# Patient Record
Sex: MALE | Race: WHITE | NOT HISPANIC OR LATINO | Employment: FULL TIME | URBAN - METROPOLITAN AREA
[De-identification: names, ages, dates, MRNs, and addresses within clinical notes are randomized per-mention and may not be internally consistent; named-entity substitution may affect disease eponyms.]

---

## 2023-12-08 ENCOUNTER — OFFICE VISIT (OUTPATIENT)
Dept: INTERNAL MEDICINE | Facility: CLINIC | Age: 33
End: 2023-12-08
Payer: COMMERCIAL

## 2023-12-08 VITALS
BODY MASS INDEX: 32.99 KG/M2 | WEIGHT: 230.4 LBS | HEART RATE: 76 BPM | DIASTOLIC BLOOD PRESSURE: 70 MMHG | HEIGHT: 70 IN | OXYGEN SATURATION: 98 % | TEMPERATURE: 96.9 F | SYSTOLIC BLOOD PRESSURE: 118 MMHG

## 2023-12-08 DIAGNOSIS — H93.13 BILATERAL TINNITUS: ICD-10-CM

## 2023-12-08 DIAGNOSIS — Z72.0 TOBACCO DIPPER: ICD-10-CM

## 2023-12-08 DIAGNOSIS — Z13.9 SCREENING FOR UNSPECIFIED CONDITION: ICD-10-CM

## 2023-12-08 DIAGNOSIS — Z87.828 HISTORY OF DISLOCATION OF FINGER: ICD-10-CM

## 2023-12-08 DIAGNOSIS — Z98.890 HISTORY OF TYMPANOPLASTY: ICD-10-CM

## 2023-12-08 DIAGNOSIS — E66.9 OBESITY, CLASS I, BMI 30-34.9: ICD-10-CM

## 2023-12-08 DIAGNOSIS — Z00.00 ANNUAL PHYSICAL EXAM: Primary | ICD-10-CM

## 2023-12-08 PROCEDURE — 99385 PREV VISIT NEW AGE 18-39: CPT | Performed by: FAMILY MEDICINE

## 2023-12-08 NOTE — PROGRESS NOTES
Date of Encounter: 2023  Patient: Joseph Rooney,  1990    Subjective   History of Presenting Illness  Chief complaint: Annual physical    No acute concerns.    History of tympanostomy tubes with tympanoplasty after membrane did not heal after removal.  He has bilateral tinnitus, mild, possibly with some hearing loss.    History of right fifth digit injury when playing baseball, did not get an x-ray, reduced range of motion and some deviation but does not cause any functional limitations day-to-day.     since a young age, gets oral cancer screenings through dentist, thinking about quitting sometime over the next few years.    Lives with wife Ester who is a trauma surgeon.  Recently moved from [place].  No children.  Has never smoked tobacco, dips about 1 can per week since early adulthood.  4 alcoholic beverages per week.  Exercises vigorously almost daily doing a combination of resistance training and cardiovascular activity, can get heart rate up to 190s.  No recent musculoskeletal injuries.  No family history of colon or prostate cancer.  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  Does not have a living will.  Reportedly up-to-date on influenza, COVID-19, and Td vaccinations, he can get me records if requested.    Review of Systems:  Negative for fever, congestion, chest pain upon exertion, shortness of breath, vomiting, dysuria, lymphadenopathy, muscle weakness, rashes.    The following portions of the patient's history were reviewed and updated as appropriate: allergies, current medications, past family history, past medical history, past social history, past surgical history and problem list.    Patient Active Problem List   Diagnosis    History of tympanoplasty    Bilateral tinnitus    Obesity, Class I, BMI 30-34.9        History of dislocation of finger     History reviewed. No pertinent past medical history.  Past Surgical History:   Procedure Laterality Date     "ADENOIDECTOMY      REPAIR PATELLAR TENDON      TONSILLECTOMY      TYMPANOPLASTY Bilateral      Family History   Problem Relation Age of Onset    Heart disease Mother     COPD Mother     Alcohol abuse Maternal Grandmother     Alcohol abuse Maternal Grandfather     Lymphoma Paternal Grandfather      No current outpatient medications on file.  No Known Allergies  Social History     Tobacco Use    Smoking status: Never     Passive exposure: Never    Smokeless tobacco: Never   Substance Use Topics    Alcohol use: Never    Drug use: Never          Objective   Physical Exam  Vitals:    12/08/23 0805   BP: 118/70   BP Location: Left arm   Patient Position: Sitting   Cuff Size: Large Adult   Pulse: 76   Temp: 96.9 °F (36.1 °C)   TempSrc: Infrared   SpO2: 98%   Weight: 105 kg (230 lb 6.4 oz)   Height: 177.8 cm (70\")     Body mass index is 33.06 kg/m².    Constitutional: NAD.  Eyes: EOMI. PERRLA. Normal conjunctiva.  Ear, nose, mouth, throat: No tonsillar exudates or erythema.   Normal nasal mucosa. Normal external ear canals and TMs bilaterally.  Cardiovascular: RRR. No murmurs. No LE edema b/l. Radial pulses 2+ bilaterally.  Pulmonary: CTA b/l. Good effort.  Integumentary: No rashes or wounds on face or upper extremities.  Lymphatic: No anterior cervical lymphadenopathy.  Endocrine: No thyromegaly or palpable thyroid nodules.  Psychiatric: Normal affect. Normal thought content.  Gastrointestinal: Nondistended. No hepatosplenomegaly. No focal tenderness to palpation. Normal bowel sounds.     Assessment & Plan   Assessment and Plan  Very pleasant 33-year-old male with history of tympanoplasty with bilateral tinnitus, tobacco dipping, BMI greater than 30, who presents with the following:    Diagnoses and all orders for this visit:    1. Annual physical exam (Primary):  We discussed preventative care including age and patient-appropriate immunizations and cancer screening. We also discussed the importance of exercise and a " healthy diet. This is their annual preventative exam.    2. Bilateral tinnitus: Mild, not needed medication or audiological evaluation at this time  3. History of tympanoplasty    4. History of dislocation of finger: No major functional impairments    5. : Discussed cessation, continue annual oral cancer screening    6. Screening for unspecified condition  -     Urinalysis With Microscopic - Urine, Clean Catch  -     Comprehensive Metabolic Panel  -     CBC & Differential  -     Lipid Panel  -     Thyroid Panel With TSH  -     Hemoglobin A1c    7. Obesity, Class I, BMI 30-34.9: Probably not entirely accurate because he does do a lot of strength training, but we did discuss weight loss as a consideration.  Also discussed medications.    Return to office in 1 year for annual physical or earlier as needed.    Vick Garrido MD  Family Medicine  O: [phone]    Disclaimer: Parts of this note were dictated by speech recognition. Minor errors in transcription may be present. Please call if questions.

## 2023-12-12 LAB
FT4I SERPL CALC-MCNC: 1.8 (ref 1.2–4.9)
T3RU NFR SERPL: 28 % (ref 24–39)
T4 SERPL-MCNC: 6.4 UG/DL (ref 4.5–12)
TSH SERPL DL<=0.005 MIU/L-ACNC: 1.73 UIU/ML (ref 0.45–4.5)

## 2023-12-13 PROBLEM — E78.5 HYPERLIPIDEMIA: Status: ACTIVE | Noted: 2023-12-13

## 2023-12-13 LAB
ALBUMIN SERPL-MCNC: 4.1 G/DL (ref 4.1–5.1)
ALBUMIN/GLOB SERPL: 1.6 {RATIO} (ref 1.2–2.2)
ALP SERPL-CCNC: 56 IU/L (ref 44–121)
ALT SERPL-CCNC: 16 IU/L (ref 0–44)
AST SERPL-CCNC: 25 IU/L (ref 0–40)
BASOPHILS # BLD AUTO: 0.1 X10E3/UL (ref 0–0.2)
BASOPHILS NFR BLD AUTO: 2 %
BILIRUB SERPL-MCNC: 0.4 MG/DL (ref 0–1.2)
BUN SERPL-MCNC: 9 MG/DL (ref 6–20)
BUN/CREAT SERPL: 9 (ref 9–20)
CALCIUM SERPL-MCNC: 9.2 MG/DL (ref 8.7–10.2)
CHLORIDE SERPL-SCNC: 105 MMOL/L (ref 96–106)
CHOLEST SERPL-MCNC: 215 MG/DL (ref 100–199)
CO2 SERPL-SCNC: 22 MMOL/L (ref 20–29)
CREAT SERPL-MCNC: 0.95 MG/DL (ref 0.76–1.27)
EGFRCR SERPLBLD CKD-EPI 2021: 107 ML/MIN/1.73
EOSINOPHIL # BLD AUTO: 0.2 X10E3/UL (ref 0–0.4)
EOSINOPHIL NFR BLD AUTO: 3 %
ERYTHROCYTE [DISTWIDTH] IN BLOOD BY AUTOMATED COUNT: 12.3 % (ref 11.6–15.4)
GLOBULIN SER CALC-MCNC: 2.6 G/DL (ref 1.5–4.5)
GLUCOSE SERPL-MCNC: 83 MG/DL (ref 70–99)
GLUCOSE UR QL STRIP: NORMAL
HBA1C MFR BLD: 5.3 % (ref 4.8–5.6)
HCT VFR BLD AUTO: 44.6 % (ref 37.5–51)
HDLC SERPL-MCNC: 54 MG/DL
HGB BLD-MCNC: 15.1 G/DL (ref 13–17.7)
IMM GRANULOCYTES # BLD AUTO: 0 X10E3/UL (ref 0–0.1)
IMM GRANULOCYTES NFR BLD AUTO: 1 %
KETONES UR QL STRIP: NORMAL
LDLC SERPL CALC-MCNC: 151 MG/DL (ref 0–99)
LYMPHOCYTES # BLD AUTO: 2.9 X10E3/UL (ref 0.7–3.1)
LYMPHOCYTES NFR BLD AUTO: 54 %
MCH RBC QN AUTO: 31.5 PG (ref 26.6–33)
MCHC RBC AUTO-ENTMCNC: 33.9 G/DL (ref 31.5–35.7)
MCV RBC AUTO: 93 FL (ref 79–97)
MONOCYTES # BLD AUTO: 0.5 X10E3/UL (ref 0.1–0.9)
MONOCYTES NFR BLD AUTO: 9 %
MORPHOLOGY BLD-IMP: NORMAL
NEUTROPHILS # BLD AUTO: 1.7 X10E3/UL (ref 1.4–7)
NEUTROPHILS NFR BLD AUTO: 31 %
PH UR STRIP: NORMAL [PH]
PLATELET # BLD AUTO: 320 X10E3/UL (ref 150–450)
POTASSIUM SERPL-SCNC: 4.2 MMOL/L (ref 3.5–5.2)
PROT SERPL-MCNC: 6.7 G/DL (ref 6–8.5)
PROT UR QL STRIP: NORMAL
RBC # BLD AUTO: 4.79 X10E6/UL (ref 4.14–5.8)
SODIUM SERPL-SCNC: 141 MMOL/L (ref 134–144)
SP GR UR STRIP: NORMAL
SPECIMEN STATUS: NORMAL
TRIGL SERPL-MCNC: 56 MG/DL (ref 0–149)
VLDLC SERPL CALC-MCNC: 10 MG/DL (ref 5–40)
WBC # BLD AUTO: 5.4 X10E3/UL (ref 3.4–10.8)

## 2023-12-13 NOTE — PROGRESS NOTES
Bloodwork looks good with the exception that your cholesterol is high.    In individuals without evidence of familial hyperlipidemia (LDL in excess of 190 or other specific findings) we opt to use the 10-year atherosclerotic cardiovascular disease risk score based upon the [place] data.  At your age and health your risk is minimal, so the only thing warranted is monitoring the lipids every few years or when there is a major lifestyle change to track them over time until your risk gets high enough to warrant additional evaluation (coronary calcium scan) or medication.    I would focus on reducing animal byproducts intake, red meat intake, and try to reduce your total body fat percentage as able within your exercise goals.    Everything else looks great.  Stay well.

## 2024-04-05 ENCOUNTER — PROCEDURE VISIT (OUTPATIENT)
Dept: SURGERY | Facility: CLINIC | Age: 34
End: 2024-04-05
Payer: COMMERCIAL

## 2024-04-05 DIAGNOSIS — L98.9 SKIN LESION OF FACE: Primary | ICD-10-CM

## 2024-04-05 PROCEDURE — 88305 TISSUE EXAM BY PATHOLOGIST: CPT | Performed by: SURGERY

## 2024-04-05 NOTE — PROGRESS NOTES
Procedure Note:    Pre-Operative Diagnosis: skin lesion of left temple    Post-Operative Diagnosis: same    Procedure performed: punch biopsy    Surgeon: Ester Rosa MD    Assistant: None    Anesthesia: Local (1% Lidocaine with epinephrine) 3 mL    Complications: None    EBL: Minimal    Specimens: facial skin lesion    Description of Procedure:     Written informed consent was obtained from the patient.  The area was prepped and draped sterilely using Hibiclens and blue towels.  Local anesthetic was injected. A 5 mm punch biopsy was used and the lesion was  from the dermis with 15 blade scalpel. 5-0 nylon was used to reapproximate skin edges. A bandaid was applied.

## 2024-04-08 PROBLEM — L57.0 ACTINIC KERATOSIS OF LEFT TEMPLE: Status: ACTIVE | Noted: 2024-04-08

## 2024-04-08 LAB
LAB AP CASE REPORT: NORMAL
LAB AP CLINICAL INFORMATION: NORMAL
PATH REPORT.FINAL DX SPEC: NORMAL
PATH REPORT.GROSS SPEC: NORMAL